# Patient Record
Sex: FEMALE | Race: WHITE | NOT HISPANIC OR LATINO | URBAN - METROPOLITAN AREA
[De-identification: names, ages, dates, MRNs, and addresses within clinical notes are randomized per-mention and may not be internally consistent; named-entity substitution may affect disease eponyms.]

---

## 2018-03-08 ENCOUNTER — APPOINTMENT (OUTPATIENT)
Age: 63
Setting detail: DERMATOLOGY
End: 2018-03-08

## 2018-03-08 VITALS — DIASTOLIC BLOOD PRESSURE: 86 MMHG | SYSTOLIC BLOOD PRESSURE: 142 MMHG

## 2018-03-08 DIAGNOSIS — L82.1 OTHER SEBORRHEIC KERATOSIS: ICD-10-CM

## 2018-03-08 DIAGNOSIS — L30.8 OTHER SPECIFIED DERMATITIS: ICD-10-CM

## 2018-03-08 PROBLEM — D23.61 OTHER BENIGN NEOPLASM OF SKIN OF RIGHT UPPER LIMB, INCLUDING SHOULDER: Status: ACTIVE | Noted: 2018-03-08

## 2018-03-08 PROCEDURE — 99203 OFFICE O/P NEW LOW 30 MIN: CPT

## 2018-03-08 PROCEDURE — ? OBSERVATION

## 2018-03-08 PROCEDURE — ? TREATMENT REGIMEN

## 2018-03-08 ASSESSMENT — LOCATION ZONE DERM: LOCATION ZONE: SCALP

## 2018-03-08 ASSESSMENT — LOCATION DETAILED DESCRIPTION DERM: LOCATION DETAILED: LEFT CENTRAL FRONTAL SCALP

## 2018-03-08 ASSESSMENT — LOCATION SIMPLE DESCRIPTION DERM: LOCATION SIMPLE: SCALP

## 2018-03-08 NOTE — PROCEDURE: TREATMENT REGIMEN
Detail Level: Zone
Otc Regimen: Cetaphil or Cerave \\nCotton Lined rubber gloves\\nMoisturize \\nCrazy glue for fissures

## 2018-03-08 NOTE — HPI: FULL BODY SKIN EXAMINATION
What Is The Reason For Today's Visit?: Full Body Skin Examination
What Is The Reason For Today's Visit? (Being Monitored For X): concerning skin lesions on an annual basis
Additional History: Patient states that she has a few lesions to discuss today.

## 2019-03-29 ENCOUNTER — APPOINTMENT (OUTPATIENT)
Age: 64
Setting detail: DERMATOLOGY
End: 2019-03-29

## 2019-03-29 VITALS — SYSTOLIC BLOOD PRESSURE: 128 MMHG | DIASTOLIC BLOOD PRESSURE: 80 MMHG

## 2019-03-29 DIAGNOSIS — L259 CONTACT DERMATITIS AND OTHER ECZEMA, UNSPECIFIED CAUSE: ICD-10-CM

## 2019-03-29 PROBLEM — D23.5 OTHER BENIGN NEOPLASM OF SKIN OF TRUNK: Status: ACTIVE | Noted: 2019-03-29

## 2019-03-29 PROBLEM — L23.9 ALLERGIC CONTACT DERMATITIS, UNSPECIFIED CAUSE: Status: ACTIVE | Noted: 2019-03-29

## 2019-03-29 PROCEDURE — 99214 OFFICE O/P EST MOD 30 MIN: CPT

## 2019-03-29 PROCEDURE — ? RECOMMENDATIONS

## 2019-03-29 PROCEDURE — ? OBSERVATION

## 2019-03-29 NOTE — PROCEDURE: RECOMMENDATIONS
Detail Level: Simple
Recommendation Preamble: The following recommendations were made during the visit:
Recommendations (Free Text): Patch Testing

## 2019-05-20 ENCOUNTER — APPOINTMENT (OUTPATIENT)
Age: 64
Setting detail: DERMATOLOGY
End: 2019-05-20

## 2019-05-20 DIAGNOSIS — L259 CONTACT DERMATITIS AND OTHER ECZEMA, UNSPECIFIED CAUSE: ICD-10-CM

## 2019-05-20 PROBLEM — L23.9 ALLERGIC CONTACT DERMATITIS, UNSPECIFIED CAUSE: Status: ACTIVE | Noted: 2019-05-20

## 2019-05-20 PROCEDURE — ? PATCH TESTING

## 2019-05-20 PROCEDURE — 95044 PATCH/APPLICATION TESTS: CPT

## 2019-05-20 NOTE — HPI: OTHER
Condition:: Allergic contact dermatitis
Please Describe Your Condition:: Patient is here for patch testing

## 2019-05-20 NOTE — PROCEDURE: PATCH TESTING
Number Of Patches (Maximum Allowable Per Dos By Cms Is 90): 80
Post-Care Instructions: I reviewed with the patient in detail post-care instructions. Patient should not sweat, pick at, or get the patches wet for 48 hours.
Consent: Written consent obtained, risks reviewed including but not limited to rash, itching, allergic reaction, systemic rash, remote possiblity of anaphylaxis to allergen.
Detail Level: Zone

## 2019-05-22 ENCOUNTER — APPOINTMENT (OUTPATIENT)
Age: 64
Setting detail: DERMATOLOGY
End: 2019-05-22

## 2019-05-22 DIAGNOSIS — L23.9 ALLERGIC CONTACT DERMATITIS, UNSPECIFIED CAUSE: ICD-10-CM

## 2019-05-22 PROCEDURE — ? EXTENDED SERIES READING

## 2019-05-22 PROCEDURE — 99212 OFFICE O/P EST SF 10 MIN: CPT

## 2019-05-22 NOTE — PROCEDURE: EXTENDED SERIES READING
Allergen 165 Reaction: no reaction
Name Of Allergen 9: Methylisothiazolinone 0.2% aq
Name Of Allergen 48: Cinnamic aldehyde 1% pet
Name Of Allergen 16: Black Rubber Mix 0.6% pet
Name Of Allergen 69: Cetyl stearyl alcohol 20% pet
Name Of Allergen 21: Formaldehyde 1% aq
Name Of Allergen 44: Oleamidopropyl dimethylamine 0.1%
Name Of Allergen 47: Lavender absolute 2% pet
Name Of Allergen 39: Polymyxin B sulfate 3% pet
Name Of Allergen 68: n,n-diphenylguanidine 1% pet
Name Of Allergen 8: Paraben Mix 12% pet
Name Of Allergen 64: Juan 2% pet
Name Of Allergen 2: Lanolin Alcohol 50% pet
Name Of Allergen 7: Colophony 20% pet
Name Of Allergen 81: Polyethylene glycol
Name Of Allergen 45: Decyl glucoside 5% pet
Name Of Allergen 66: Ethyleneurea melamine-formaldehyde
Name Of Allergen 29: Imidazolidinyl urea 2% pet
Allergen 12 Counseling: Carba mix chemicals are used primarily in the production of rubber or latex products. Both natural and synthetic rubber may contain these agents, and sources include the followin. Gloves (household, work, or hospital) 2. Rubber shoes (sneakers, tennis shoes, and the like) 3. Leather shoes (insoles, adhesives,and linings) 4. Sponge makeup applicators and rubber eyelash curlers 5. Rubber in elasticized undergarments and clothing 6. Rubber pillows and sheets 7. Condoms and diaphragms 8. Medical devices 9. Swim wear 10. Tires 11. Renal dialysis equipment 12. Heavy rubber products used in industry 13. Rubber bands 14. Balloons and rubber toys  Other, nonrubber sources of exposure include the following products: 1. Disinfectants, repellents, fungicides, and insecticides used in agriculture 2. Adhesives, cement, sealants 3. Soaps and shampoos  Prevention If patients are carba sensitive and have foot dermatitis, it is probably due to their shoes. They may wear all leather shoes with no inner or outer sole, like moccasins. Molded plastic shoes or wooden clogs can be worn. Patients should contact their local shoe stores and ask for rubber-free shoes or contact an orthotic shop to have shoes custom-made (Box 5-2). If they cannot find such shoes, the insoles should be removed from leather shoes and insoles cut from piano felt, cork, or plastic should be inserted. Sweating should be minimized, and socks that may have absorbed allergens should be discarded.  Patients who are carba sensitive and have hand dermatitis should avoid rubber (latex) gloves and, if possible, wear vinyl gloves only (Box 5-3). If rubber gloves must be worn, manufacturers should be contacted, to acquire carba-free gloves. One important resource that can be contacted if the patient has persistent difficultiesis at the [employer], [employer] [address], [place], [place] [address]. Call [phone] or visit them at [email or web address].  Allergic patients should avoid contact with other rubber products as listed earlier and check the chemicals used in their work if they are in the agricultural industries. If such patients work with instruments with rubber tubes and hoses, the instruments should not be touched unless vinyl or carba-free rubber gloves are being worn.
Name Of Allergen 80: Benzyl alcohol 10%
Name Of Allergen 51: Tea tree oil 5% pet
Name Of Allergen 56: Tosylamide formaldehyde resin 10%
Name Of Allergen 24: Thiuram mix 1% pet
Name Of Allergen 11: Ethylenediamine dihydrochloride 1%
Name Of Allergen 43: Hydroxyethyl methacrylate 2% pet
Name Of Allergen 42: 3-Dimethylamino-propylamine (DMAPA) 1% aq
Name Of Allergen 14: Epoxy resin 1% pet
Name Of Allergen 20: p-Phenylenediamine 1% pet
Name Of Allergen 63: Sorbic acid 2% pet
Name Of Allergen 1: Nickel sulfate2.5% pet
Name Of Allergen 49: Tocopherol 100% (Vitamin E)
Detail Level: Detailed
Name Of Allergen 75: Phenoxyethanol 1% pet
Name Of Allergen 6: Fragrance mix I 8% pet
Name Of Allergen 57: Sesquiterpene lactone mix 0.1% pet
Name Of Allergen 26: Benzocaine 5% pet
Name Of Allergen 28: Gold sodium thiosulfate 2% pet
Name Of Allergen 72: Clobetasol-17-proprionate 1% pet
Show Allergen Counseling In The Note?: Yes
Name Of Allergen 46: Methyl methacrylate 2% pet
Name Of Allergen 4: Potassium dichromate 0.25% pet
Name Of Allergen 22: Mercapto mix 1% pet
Name Of Allergen 73: Amidoamine 0.1% aq
Name Of Allergen 36: LIdocaine 15% pet
Name Of Allergen 78: 2,6-Diter-butyl-4-cresol (BHT) 2%
Name Of Allergen 3: Neomycin 20% pet
Name Of Allergen 12: Cobalt chloride 1% pet
Name Of Allergen 71: Triamcinolone 1% pet
Name Of Allergen 50: Ethyl acrylate 0.1% pet
Name Of Allergen 34: Fragrance mix II 14% pet
Name Of Allergen 67: Sorbitan sesquioleate 20% pet
Number Of Patches Read: 80
Name Of Allergen 60: Benzalkonium chloride 0.1% pet
Name Of Allergen 19: Methyldibromoglutaronititrile 0.5% pet Euxyl K400
Name Of Allergen 35: Disperse blue 106/124 mix 1.0% pet
Name Of Allergen 38: Iodopropynyl butylcarbamate 0.1% pet
Name Of Allergen 23: 2-Bromo-2-nitropopane-1,3 diol (Bronopol) 0.5%
Name Of Allergen 13: p-tert butylphenol formaldehyde resin
Name Of Allergen 54: Chloroxylenol (PCMX) 1% pet
Name Of Allergen 5: DMDM hydantoin 1% pet
What Reading Time Point?: 48 hour
Name Of Allergen 77: Benzoic acid 5% pet
Name Of Allergen 18: Quaternium 15 2% pet
Name Of Allergen 31: Hydrocortisone-17-butyrate 1% pet
Name Of Allergen 53: Propolis 10% pet
Name Of Allergen 61: Benzophenone-4 2% pet
Name Of Allergen 74: Ethyl cycnoacrylate 10% pet
Name Of Allergen 27: Tixocortol-21-pivalate 1% pet
Name Of Allergen 52: Chlorhexidine digluconate 0.5% aq
Name Of Allergen 32: Mercaptobenzothiazole 1% pet
Name Of Allergen 15: Carba mix 3% pet
Name Of Allergen 76: Disperse Orange 3, 1% pet
Name Of Allergen 17: Methylchlorisothiazolinone (Chencho ADAME)
Name Of Allergen 70: Ethylhexylglycerin 5% pet
Name Of Allergen 33: Bacitracin 20% pet
Name Of Allergen 79: 2-ethylhexyl-4-methoxycinnamate 10%
Name Of Allergen 37: Propylene Glycol 30% aq
Name Of Allergen 59: 4-chloro-3-cresol (PCMC) 1% pet
Name Of Allergen 62: Sodium benzoate 5% pet
Name Of Allergen 41: Mixed dialkyl thioureas 1% pet
Name Of Allergen 30: Budesonide 0.1% pet
Name Of Allergen 82: Propyl gallate
Name Of Allergen 25: Diazolidinyl urea 1% pet
Name Of Allergen 40: Cocamidopropyl betaine 1% aq
Name Of Allergen 65: Compositae mix II 5% pet
Name Of Allergen 10: Balsam of [place] 25% pet
Name Of Allergen 58: Cocamide CADEN 0.5% pet
Name Of Allergen 55: Benzophenone-3, 10% pet

## 2019-05-22 NOTE — HPI: TESTING (PATCH TESTING READING, DISCUSSION OF RESULTS)
What Patch Testing Have You Undergone?: Extended Series
What Reading Is This?: 48 hour patch test reading

## 2019-05-24 ENCOUNTER — APPOINTMENT (OUTPATIENT)
Age: 64
Setting detail: DERMATOLOGY
End: 2019-05-24

## 2019-05-24 DIAGNOSIS — L259 CONTACT DERMATITIS AND OTHER ECZEMA, UNSPECIFIED CAUSE: ICD-10-CM

## 2019-05-24 PROBLEM — L23.9 ALLERGIC CONTACT DERMATITIS, UNSPECIFIED CAUSE: Status: ACTIVE | Noted: 2019-05-24

## 2019-05-24 PROCEDURE — 99212 OFFICE O/P EST SF 10 MIN: CPT

## 2019-05-24 PROCEDURE — ? COUNSELING

## 2019-05-24 PROCEDURE — ? EXTENDED SERIES READING

## 2019-05-24 PROCEDURE — ? OBSERVATION

## 2019-05-24 ASSESSMENT — LOCATION DETAILED DESCRIPTION DERM: LOCATION DETAILED: SUPERIOR THORACIC SPINE

## 2019-05-24 ASSESSMENT — LOCATION SIMPLE DESCRIPTION DERM: LOCATION SIMPLE: UPPER BACK

## 2019-05-24 ASSESSMENT — LOCATION ZONE DERM: LOCATION ZONE: TRUNK

## 2019-05-24 NOTE — PROCEDURE: EXTENDED SERIES READING
Allergen 176 Reaction: no reaction
Name Of Allergen 56: Tosylamide formaldehyde resin 10%
Name Of Allergen 43: Hydroxyethyl methacrylate 2% pet
Name Of Allergen 1: Nickel sulfate2.5% pet
Name Of Allergen 6: Fragrance mix I 8% pet
Allergen 12 Counseling: Carba mix chemicals are used primarily in the production of rubber or latex products. Both natural and synthetic rubber may contain these agents, and sources include the followin. Gloves (household, work, or hospital) 2. Rubber shoes (sneakers, tennis shoes, and the like) 3. Leather shoes (insoles, adhesives,and linings) 4. Sponge makeup applicators and rubber eyelash curlers 5. Rubber in elasticized undergarments and clothing 6. Rubber pillows and sheets 7. Condoms and diaphragms 8. Medical devices 9. Swim wear 10. Tires 11. Renal dialysis equipment 12. Heavy rubber products used in industry 13. Rubber bands 14. Balloons and rubber toys  Other, nonrubber sources of exposure include the following products: 1. Disinfectants, repellents, fungicides, and insecticides used in agriculture 2. Adhesives, cement, sealants 3. Soaps and shampoos  Prevention If patients are carba sensitive and have foot dermatitis, it is probably due to their shoes. They may wear all leather shoes with no inner or outer sole, like moccasins. Molded plastic shoes or wooden clogs can be worn. Patients should contact their local shoe stores and ask for rubber-free shoes or contact an orthotic shop to have shoes custom-made (Box 5-2). If they cannot find such shoes, the insoles should be removed from leather shoes and insoles cut from piano felt, cork, or plastic should be inserted. Sweating should be minimized, and socks that may have absorbed allergens should be discarded.  Patients who are carba sensitive and have hand dermatitis should avoid rubber (latex) gloves and, if possible, wear vinyl gloves only (Box 5-3). If rubber gloves must be worn, manufacturers should be contacted, to acquire carba-free gloves. One important resource that can be contacted if the patient has persistent difficultiesis at the [employer] [address], [place], [place] [address]. Call [phone] or visit them at [email or web address].  Allergic patients should avoid contact with other rubber products as listed earlier and check the chemicals used in their work if they are in the agricultural industries. If such patients work with instruments with rubber tubes and hoses, the instruments should not be touched unless vinyl or carba-free rubber gloves are being worn.
Name Of Allergen 41: Mixed dialkyl thioureas 1% pet
Name Of Allergen 63: Sorbic acid 2% pet
Name Of Allergen 70: Ethylhexylglycerin 5% pet
Name Of Allergen 52: Chlorhexidine digluconate 0.5% aq
Name Of Allergen 20: p-Phenylenediamine 1% pet
Name Of Allergen 72: Clobetasol-17-proprionate 1% pet
Name Of Allergen 69: Cetyl stearyl alcohol 20% pet
Name Of Allergen 82: Propyl gallate
Name Of Allergen 39: Polymyxin B sulfate 3% pet
Name Of Allergen 49: Tocopherol 100% (Vitamin E)
Name Of Allergen 55: Benzophenone-3, 10% pet
Name Of Allergen 66: Ethyleneurea melamine-formaldehyde
Name Of Allergen 68: n,n-diphenylguanidine 1% pet
Name Of Allergen 13: p-tert butylphenol formaldehyde resin
Name Of Allergen 4: Potassium dichromate 0.25% pet
Name Of Allergen 45: Decyl glucoside 5% pet
Name Of Allergen 34: Fragrance mix II 14% pet
Name Of Allergen 21: Formaldehyde 1% aq
Name Of Allergen 9: Methylisothiazolinone 0.2% aq
Name Of Allergen 62: Sodium benzoate 5% pet
Name Of Allergen 28: Gold sodium thiosulfate 2% pet
Name Of Allergen 30: Budesonide 0.1% pet
Name Of Allergen 79: 2-ethylhexyl-4-methoxycinnamate 10%
Name Of Allergen 71: Triamcinolone 1% pet
Name Of Allergen 35: Disperse blue 106/124 mix 1.0% pet
Number Of Patches Read: 80
Name Of Allergen 27: Tixocortol-21-pivalate 1% pet
Name Of Allergen 31: Hydrocortisone-17-butyrate 1% pet
Name Of Allergen 18: Quaternium 15 2% pet
Name Of Allergen 48: Cinnamic aldehyde 1% pet
Name Of Allergen 22: Mercapto mix 1% pet
Name Of Allergen 64: Juan 2% pet
Name Of Allergen 38: Iodopropynyl butylcarbamate 0.1% pet
Name Of Allergen 11: Ethylenediamine dihydrochloride 1%
Name Of Allergen 53: Propolis 10% pet
Name Of Allergen 77: Benzoic acid 5% pet
Name Of Allergen 51: Tea tree oil 5% pet
Name Of Allergen 19: Methyldibromoglutaronititrile 0.5% pet Euxyl K400
Name Of Allergen 75: Phenoxyethanol 1% pet
Name Of Allergen 54: Chloroxylenol (PCMX) 1% pet
Name Of Allergen 44: Oleamidopropyl dimethylamine 0.1%
Name Of Allergen 40: Cocamidopropyl betaine 1% aq
Name Of Allergen 8: Paraben Mix 12% pet
Name Of Allergen 7: Colophony 20% pet
Name Of Allergen 73: Amidoamine 0.1% aq
Name Of Allergen 61: Benzophenone-4 2% pet
Name Of Allergen 26: Benzocaine 5% pet
Name Of Allergen 76: Disperse Orange 3, 1% pet
Name Of Allergen 67: Sorbitan sesquioleate 20% pet
Name Of Allergen 3: Neomycin 20% pet
Name Of Allergen 50: Ethyl acrylate 0.1% pet
Name Of Allergen 74: Ethyl cycnoacrylate 10% pet
Allergen 34 Reaction: 1+
Name Of Allergen 25: Diazolidinyl urea 1% pet
Name Of Allergen 24: Thiuram mix 1% pet
Name Of Allergen 16: Black Rubber Mix 0.6% pet
Name Of Allergen 42: 3-Dimethylamino-propylamine (DMAPA) 1% aq
Name Of Allergen 5: DMDM hydantoin 1% pet
Name Of Allergen 32: Mercaptobenzothiazole 1% pet
Name Of Allergen 59: 4-chloro-3-cresol (PCMC) 1% pet
Name Of Allergen 2: Lanolin Alcohol 50% pet
Name Of Allergen 37: Propylene Glycol 30% aq
Name Of Allergen 15: Carba mix 3% pet
Name Of Allergen 36: LIdocaine 15% pet
Show Negative Results In The Note?: Yes
Name Of Allergen 10: Balsam of [place] 25% pet
Name Of Allergen 33: Bacitracin 20% pet
Name Of Allergen 17: Methylchlorisothiazolinone (Chen[place] ADAME)
Name Of Allergen 12: Cobalt chloride 1% pet
What Reading Time Point?: 96 hour
Name Of Allergen 80: Benzyl alcohol 10%
Name Of Allergen 46: Methyl methacrylate 2% pet
Detail Level: Detailed
Name Of Allergen 47: Lavender absolute 2% pet
Name Of Allergen 60: Benzalkonium chloride 0.1% pet
Name Of Allergen 58: Cocamide CADEN 0.5% pet
Name Of Allergen 65: Compositae mix II 5% pet
Name Of Allergen 57: Sesquiterpene lactone mix 0.1% pet
Name Of Allergen 14: Epoxy resin 1% pet
Name Of Allergen 78: 2,6-Diter-butyl-4-cresol (BHT) 2%
Name Of Allergen 23: 2-Bromo-2-nitropopane-1,3 diol (Bronopol) 0.5%
Name Of Allergen 29: Imidazolidinyl urea 2% pet
Name Of Allergen 81: Polyethylene glycol

## 2023-03-09 ENCOUNTER — APPOINTMENT (OUTPATIENT)
Age: 68
Setting detail: DERMATOLOGY
End: 2023-03-09

## 2023-03-09 DIAGNOSIS — L72.0 EPIDERMAL CYST: ICD-10-CM

## 2023-03-09 DIAGNOSIS — L81.4 OTHER MELANIN HYPERPIGMENTATION: ICD-10-CM

## 2023-03-09 DIAGNOSIS — D22 MELANOCYTIC NEVI: ICD-10-CM

## 2023-03-09 DIAGNOSIS — L259 CONTACT DERMATITIS AND OTHER ECZEMA, UNSPECIFIED CAUSE: ICD-10-CM | Status: WELL CONTROLLED

## 2023-03-09 DIAGNOSIS — L82.1 OTHER SEBORRHEIC KERATOSIS: ICD-10-CM

## 2023-03-09 PROBLEM — L23.9 ALLERGIC CONTACT DERMATITIS, UNSPECIFIED CAUSE: Status: ACTIVE | Noted: 2023-03-09

## 2023-03-09 PROBLEM — D22.5 MELANOCYTIC NEVI OF TRUNK: Status: ACTIVE | Noted: 2023-03-09

## 2023-03-09 PROCEDURE — ? RECOMMENDATIONS

## 2023-03-09 PROCEDURE — ? OBSERVATION

## 2023-03-09 PROCEDURE — ? COUNSELING

## 2023-03-09 PROCEDURE — 99203 OFFICE O/P NEW LOW 30 MIN: CPT

## 2023-03-09 ASSESSMENT — LOCATION SIMPLE DESCRIPTION DERM
LOCATION SIMPLE: ABDOMEN
LOCATION SIMPLE: RIGHT UPPER BACK
LOCATION SIMPLE: RIGHT SHOULDER
LOCATION SIMPLE: LEFT UPPER BACK
LOCATION SIMPLE: RIGHT SHOULDER

## 2023-03-09 ASSESSMENT — LOCATION DETAILED DESCRIPTION DERM
LOCATION DETAILED: LEFT RIB CAGE
LOCATION DETAILED: RIGHT INFERIOR UPPER BACK
LOCATION DETAILED: LEFT INFERIOR UPPER BACK
LOCATION DETAILED: RIGHT POSTERIOR SHOULDER
LOCATION DETAILED: RIGHT POSTERIOR SHOULDER
LOCATION DETAILED: LEFT SUPERIOR UPPER BACK

## 2023-03-09 ASSESSMENT — LOCATION ZONE DERM
LOCATION ZONE: ARM
LOCATION ZONE: ARM
LOCATION ZONE: TRUNK

## 2023-03-09 NOTE — PROCEDURE: RECOMMENDATIONS
Recommendations (Free Text): Apply moisturizer to the lesion on back that are itching with a
Recommendation Preamble: The following recommendations were made during the visit:
Detail Level: Zone
Render Risk Assessment In Note?: no
Recommendations (Free Text): Will send a new update CAMP list

## 2025-08-05 ENCOUNTER — APPOINTMENT (OUTPATIENT)
Age: 70
Setting detail: DERMATOLOGY
End: 2025-08-05

## 2025-08-05 DIAGNOSIS — L81.4 OTHER MELANIN HYPERPIGMENTATION: ICD-10-CM

## 2025-08-05 DIAGNOSIS — L259 CONTACT DERMATITIS AND OTHER ECZEMA, UNSPECIFIED CAUSE: ICD-10-CM

## 2025-08-05 DIAGNOSIS — L82.1 OTHER SEBORRHEIC KERATOSIS: ICD-10-CM

## 2025-08-05 DIAGNOSIS — D18.0 HEMANGIOMA: ICD-10-CM

## 2025-08-05 DIAGNOSIS — D22 MELANOCYTIC NEVI: ICD-10-CM

## 2025-08-05 PROBLEM — D18.01 HEMANGIOMA OF SKIN AND SUBCUTANEOUS TISSUE: Status: ACTIVE | Noted: 2025-08-05

## 2025-08-05 PROBLEM — L30.9 DERMATITIS, UNSPECIFIED: Status: ACTIVE | Noted: 2025-08-05

## 2025-08-05 PROBLEM — D22.5 MELANOCYTIC NEVI OF TRUNK: Status: ACTIVE | Noted: 2025-08-05

## 2025-08-05 PROBLEM — D22.9 MELANOCYTIC NEVI, UNSPECIFIED: Status: ACTIVE | Noted: 2025-08-05

## 2025-08-05 PROCEDURE — ? COUNSELING

## 2025-08-05 PROCEDURE — ? PRESCRIPTION

## 2025-08-05 PROCEDURE — ? PRESCRIPTION MEDICATION MANAGEMENT

## 2025-08-05 PROCEDURE — ? OBSERVATION

## 2025-08-05 RX ORDER — MOMETASONE FUROATE 1 MG/G
CREAM TOPICAL
Qty: 15 | Refills: 3 | Status: ERX | COMMUNITY
Start: 2025-08-05

## 2025-08-05 RX ORDER — MUPIROCIN 20 MG/G
OINTMENT TOPICAL
Qty: 22 | Refills: 3 | Status: ERX | COMMUNITY
Start: 2025-08-05

## 2025-08-05 RX ADMIN — MOMETASONE FUROATE: 1 CREAM TOPICAL at 00:00

## 2025-08-05 RX ADMIN — MUPIROCIN: 20 OINTMENT TOPICAL at 00:00

## 2025-08-05 ASSESSMENT — LOCATION SIMPLE DESCRIPTION DERM
LOCATION SIMPLE: ABDOMEN
LOCATION SIMPLE: LEFT SHOULDER
LOCATION SIMPLE: LEFT UPPER BACK
LOCATION SIMPLE: LEFT LOWER BACK
LOCATION SIMPLE: LEFT CALF

## 2025-08-05 ASSESSMENT — LOCATION ZONE DERM
LOCATION ZONE: ARM
LOCATION ZONE: LEG
LOCATION ZONE: TRUNK

## 2025-08-05 ASSESSMENT — ITCH NUMERIC RATING SCALE: HOW SEVERE IS YOUR ITCHING?: 0

## 2025-08-05 ASSESSMENT — LOCATION DETAILED DESCRIPTION DERM
LOCATION DETAILED: LEFT SUPERIOR MEDIAL MIDBACK
LOCATION DETAILED: UMBILICUS
LOCATION DETAILED: LEFT PROXIMAL CALF
LOCATION DETAILED: LEFT POSTERIOR SHOULDER
LOCATION DETAILED: LEFT SUPERIOR UPPER BACK

## 2025-08-05 ASSESSMENT — BSA RASH: BSA RASH: 0
